# Patient Record
Sex: FEMALE | Race: WHITE | Employment: STUDENT | URBAN - METROPOLITAN AREA
[De-identification: names, ages, dates, MRNs, and addresses within clinical notes are randomized per-mention and may not be internally consistent; named-entity substitution may affect disease eponyms.]

---
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
## 2024-03-26 ENCOUNTER — OFFICE VISIT (OUTPATIENT)
Dept: PRIMARY CARE CLINIC | Age: 19
End: 2024-03-26
Payer: COMMERCIAL

## 2024-03-26 VITALS
OXYGEN SATURATION: 98 % | WEIGHT: 134.2 LBS | HEART RATE: 82 BPM | DIASTOLIC BLOOD PRESSURE: 62 MMHG | TEMPERATURE: 98.8 F | BODY MASS INDEX: 23.78 KG/M2 | HEIGHT: 63 IN | SYSTOLIC BLOOD PRESSURE: 118 MMHG

## 2024-03-26 DIAGNOSIS — M54.42 ACUTE MIDLINE LOW BACK PAIN WITH BILATERAL SCIATICA: Primary | ICD-10-CM

## 2024-03-26 DIAGNOSIS — M54.41 ACUTE MIDLINE LOW BACK PAIN WITH BILATERAL SCIATICA: Primary | ICD-10-CM

## 2024-03-26 PROCEDURE — 99213 OFFICE O/P EST LOW 20 MIN: CPT

## 2024-03-26 PROCEDURE — G8484 FLU IMMUNIZE NO ADMIN: HCPCS

## 2024-03-26 PROCEDURE — G8427 DOCREV CUR MEDS BY ELIG CLIN: HCPCS

## 2024-03-26 PROCEDURE — 1036F TOBACCO NON-USER: CPT

## 2024-03-26 PROCEDURE — G8420 CALC BMI NORM PARAMETERS: HCPCS

## 2024-03-26 RX ORDER — METHOCARBAMOL 750 MG/1
750 TABLET, FILM COATED ORAL
Qty: 28 TABLET | Refills: 0 | Status: SHIPPED | OUTPATIENT
Start: 2024-03-26

## 2024-03-26 RX ORDER — METHYLPREDNISOLONE 4 MG/1
TABLET ORAL
Qty: 1 KIT | Refills: 0 | Status: SHIPPED | OUTPATIENT
Start: 2024-03-26 | End: 2024-04-01

## 2024-03-26 ASSESSMENT — ENCOUNTER SYMPTOMS
GASTROINTESTINAL NEGATIVE: 1
BACK PAIN: 1
RESPIRATORY NEGATIVE: 1
BOWEL INCONTINENCE: 0
ALLERGIC/IMMUNOLOGIC NEGATIVE: 1

## 2024-03-26 ASSESSMENT — ANXIETY QUESTIONNAIRES
3. WORRYING TOO MUCH ABOUT DIFFERENT THINGS: NOT AT ALL
1. FEELING NERVOUS, ANXIOUS, OR ON EDGE: NEARLY EVERY DAY
6. BECOMING EASILY ANNOYED OR IRRITABLE: MORE THAN HALF THE DAYS
2. NOT BEING ABLE TO STOP OR CONTROL WORRYING: SEVERAL DAYS
5. BEING SO RESTLESS THAT IT IS HARD TO SIT STILL: SEVERAL DAYS
4. TROUBLE RELAXING: SEVERAL DAYS
IF YOU CHECKED OFF ANY PROBLEMS ON THIS QUESTIONNAIRE, HOW DIFFICULT HAVE THESE PROBLEMS MADE IT FOR YOU TO DO YOUR WORK, TAKE CARE OF THINGS AT HOME, OR GET ALONG WITH OTHER PEOPLE: SOMEWHAT DIFFICULT
GAD7 TOTAL SCORE: 10
7. FEELING AFRAID AS IF SOMETHING AWFUL MIGHT HAPPEN: MORE THAN HALF THE DAYS

## 2024-03-26 ASSESSMENT — PATIENT HEALTH QUESTIONNAIRE - PHQ9
1. LITTLE INTEREST OR PLEASURE IN DOING THINGS: SEVERAL DAYS
SUM OF ALL RESPONSES TO PHQ QUESTIONS 1-9: 2
SUM OF ALL RESPONSES TO PHQ9 QUESTIONS 1 & 2: 2
2. FEELING DOWN, DEPRESSED OR HOPELESS: SEVERAL DAYS
SUM OF ALL RESPONSES TO PHQ QUESTIONS 1-9: 2

## 2024-03-26 NOTE — PROGRESS NOTES
motion.      Lumbar back: Tenderness present. Decreased range of motion.   Skin:     General: Skin is warm.      Capillary Refill: Capillary refill takes less than 2 seconds.   Neurological:      General: No focal deficit present.      Mental Status: She is alert.   Psychiatric:         Mood and Affect: Mood normal.        An electronic signature was used to authenticate this note.    --Sahra Cruz, APRN - CNP

## 2024-04-18 ENCOUNTER — OFFICE VISIT (OUTPATIENT)
Dept: OBGYN CLINIC | Age: 19
End: 2024-04-18
Payer: COMMERCIAL

## 2024-04-18 VITALS
OXYGEN SATURATION: 99 % | BODY MASS INDEX: 23.39 KG/M2 | WEIGHT: 132 LBS | SYSTOLIC BLOOD PRESSURE: 110 MMHG | DIASTOLIC BLOOD PRESSURE: 72 MMHG | HEIGHT: 63 IN | HEART RATE: 78 BPM | TEMPERATURE: 98.7 F

## 2024-04-18 DIAGNOSIS — Z97.5 BREAKTHROUGH BLEEDING ON NEXPLANON: Primary | ICD-10-CM

## 2024-04-18 DIAGNOSIS — N92.1 BREAKTHROUGH BLEEDING ON NEXPLANON: Primary | ICD-10-CM

## 2024-04-18 PROCEDURE — G8428 CUR MEDS NOT DOCUMENT: HCPCS | Performed by: OBSTETRICS & GYNECOLOGY

## 2024-04-18 PROCEDURE — 1036F TOBACCO NON-USER: CPT | Performed by: OBSTETRICS & GYNECOLOGY

## 2024-04-18 PROCEDURE — G8420 CALC BMI NORM PARAMETERS: HCPCS | Performed by: OBSTETRICS & GYNECOLOGY

## 2024-04-18 PROCEDURE — 99213 OFFICE O/P EST LOW 20 MIN: CPT | Performed by: OBSTETRICS & GYNECOLOGY

## 2024-04-18 RX ORDER — LEVONORGESTREL AND ETHINYL ESTRADIOL 0.1-0.02MG
1 KIT ORAL DAILY
Qty: 1 PACKET | Refills: 3 | Status: SHIPPED | OUTPATIENT
Start: 2024-04-18

## 2024-04-18 SDOH — ECONOMIC STABILITY: FOOD INSECURITY: WITHIN THE PAST 12 MONTHS, THE FOOD YOU BOUGHT JUST DIDN'T LAST AND YOU DIDN'T HAVE MONEY TO GET MORE.: NEVER TRUE

## 2024-04-18 SDOH — ECONOMIC STABILITY: INCOME INSECURITY: HOW HARD IS IT FOR YOU TO PAY FOR THE VERY BASICS LIKE FOOD, HOUSING, MEDICAL CARE, AND HEATING?: NOT HARD AT ALL

## 2024-04-18 SDOH — ECONOMIC STABILITY: TRANSPORTATION INSECURITY
IN THE PAST 12 MONTHS, HAS LACK OF TRANSPORTATION KEPT YOU FROM MEETINGS, WORK, OR FROM GETTING THINGS NEEDED FOR DAILY LIVING?: PATIENT DECLINED

## 2024-04-18 SDOH — ECONOMIC STABILITY: FOOD INSECURITY: WITHIN THE PAST 12 MONTHS, YOU WORRIED THAT YOUR FOOD WOULD RUN OUT BEFORE YOU GOT MONEY TO BUY MORE.: NEVER TRUE

## 2024-04-18 SDOH — ECONOMIC STABILITY: HOUSING INSECURITY
IN THE LAST 12 MONTHS, WAS THERE A TIME WHEN YOU DID NOT HAVE A STEADY PLACE TO SLEEP OR SLEPT IN A SHELTER (INCLUDING NOW)?: PATIENT DECLINED

## 2024-04-18 NOTE — PROGRESS NOTES
Return Gyn Office Visit    CC:   Chief Complaint   Patient presents with    Vaginal Bleeding     Pt has nexplanon and is having abnormal bleeding since 2024       HPI:  18 y.o.  who presents to office for AUB with nexplanon. Had it placed 3/2023, was having irregular bleeding so did a short trial of OCP which helped. Stopped those and now has started to bleed irregularly again. Having daily bleeding, very light and using a pad. Has a period once a month as well that is normal.     Objective:  /72   Pulse 78   Temp 98.7 °F (37.1 °C) (Temporal)   Ht 1.6 m (5' 3\")   Wt 59.9 kg (132 lb)   LMP 2024   SpO2 99%   BMI 23.38 kg/m²   Physical Exam  HENT:      Head: Normocephalic.   Cardiovascular:      Rate and Rhythm: Normal rate.   Pulmonary:      Effort: Pulmonary effort is normal.   Abdominal:      Palpations: Abdomen is soft.      Tenderness: There is no abdominal tenderness.   Neurological:      General: No focal deficit present.      Mental Status: She is alert.   Psychiatric:         Mood and Affect: Mood normal.         Assessment/Plan  1. Breakthrough bleeding on Nexplanon  Reviewed options for management, another short course of OCP, removal, etc. Patient desires to keep nexplanon now, will send rx for OCP x2-3 months. Rx provided, return precautions discussed.    Dispo: PRN or for annual exam  Cyndi Owens MD

## 2024-05-16 RX ORDER — LEVONORGESTREL AND ETHINYL ESTRADIOL 0.1-0.02MG
1 KIT ORAL DAILY
Qty: 28 TABLET | Refills: 11 | Status: SHIPPED | OUTPATIENT
Start: 2024-05-16

## 2024-05-16 NOTE — TELEPHONE ENCOUNTER
Voicemail if full, unable to leave message. Will send mychart to see if patient is needing refill.

## 2024-06-17 ENCOUNTER — HOSPITAL ENCOUNTER (EMERGENCY)
Age: 19
Discharge: HOME | End: 2024-06-18
Payer: COMMERCIAL

## 2024-06-17 VITALS
OXYGEN SATURATION: 98 % | HEART RATE: 78 BPM | RESPIRATION RATE: 18 BRPM | SYSTOLIC BLOOD PRESSURE: 119 MMHG | DIASTOLIC BLOOD PRESSURE: 78 MMHG | TEMPERATURE: 98.7 F
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
## 2024-06-17 PROCEDURE — 99284 EMERGENCY DEPT VISIT MOD MDM: CPT

## 2024-06-17 RX ORDER — AMOXICILLIN AND CLAVULANATE POTASSIUM 875; 125 MG/1; MG/1
1 TABLET, FILM COATED ORAL 2 TIMES DAILY
Qty: 20 TABLET | Refills: 0 | Status: SHIPPED | OUTPATIENT
Start: 2024-06-17 | End: 2024-06-27

## 2024-06-17 RX ORDER — AMOXICILLIN AND CLAVULANATE POTASSIUM 875; 125 MG/1; MG/1
1 TABLET, FILM COATED ORAL ONCE
Status: COMPLETED | OUTPATIENT
Start: 2024-06-18 | End: 2024-06-18

## 2024-06-18 PROCEDURE — 6370000000 HC RX 637 (ALT 250 FOR IP): Performed by: PHYSICIAN ASSISTANT

## 2024-06-18 PROCEDURE — 6360000002 HC RX W HCPCS: Performed by: PHYSICIAN ASSISTANT

## 2024-06-18 PROCEDURE — 90471 IMMUNIZATION ADMIN: CPT | Performed by: PHYSICIAN ASSISTANT

## 2024-06-18 PROCEDURE — 90715 TDAP VACCINE 7 YRS/> IM: CPT | Performed by: PHYSICIAN ASSISTANT

## 2024-06-18 RX ADMIN — AMOXICILLIN AND CLAVULANATE POTASSIUM 1 TABLET: 875; 125 TABLET, FILM COATED ORAL at 00:03

## 2024-06-18 RX ADMIN — TETANUS TOXOID, REDUCED DIPHTHERIA TOXOID AND ACELLULAR PERTUSSIS VACCINE, ADSORBED 0.5 ML: 5; 2.5; 8; 8; 2.5 SUSPENSION INTRAMUSCULAR at 00:03

## 2024-06-18 NOTE — DISCHARGE INSTRUCTIONS
Take all antibiotics until complete  Follow up with PCP in 2-3 days  Keep wound clean and moist (vasoline/aquafore as we discussed)

## 2024-06-19 ASSESSMENT — ENCOUNTER SYMPTOMS
EYE REDNESS: 0
ABDOMINAL PAIN: 0
SINUS PAIN: 0
CONSTIPATION: 0
NAUSEA: 0
CHEST TIGHTNESS: 0
EYE DISCHARGE: 0
RHINORRHEA: 0
SINUS PRESSURE: 0
VOMITING: 0
DIARRHEA: 0
SORE THROAT: 0
SHORTNESS OF BREATH: 0
COUGH: 0

## 2024-06-19 NOTE — ED PROVIDER NOTES
to person, place, and time.   Psychiatric:         Mood and Affect: Mood normal.         Behavior: Behavior normal.         DIAGNOSTIC RESULTS       PAST MEDICAL HISTORY      has a past medical history of Chronic back pain (March 2019), Diarrhea, and Headache (Randomly).     Chronic Conditions affecting Care: above    EMERGENCY DEPARTMENT COURSE and DIFFERENTIAL DIAGNOSIS/MDM:   Vitals:    Vitals:    06/17/24 2317   BP: 119/78   Pulse: 78   Resp: 18   Temp: 98.7 °F (37.1 °C)   TempSrc: Oral   SpO2: 98%       Patient was given the following medications:  Medications   amoxicillin-clavulanate (AUGMENTIN) 875-125 MG per tablet 1 tablet (1 tablet Oral Given 6/18/24 0003)   tetanus-diphth-acell pertussis (BOOSTRIX) injection 0.5 mL (0.5 mLs IntraMUSCular Given 6/18/24 0003)             Is this patient to be included in the SEP-1 Core Measure due to severe sepsis or septic shock?   No   Exclusion criteria - the patient is NOT to be included for SEP-1 Core Measure due to:  2+ SIRS criteria are not met      CC/HPI Summary, DDx, ED Course, and Reassessment: Afebrile nontoxic patient in no acute distress.  Patient with superficial puncture wounds and abrasions to her face secondary to being by a dog she knows that shots are up-to-date.  The wounds are cleaned and copiously irrigated her tetanus is updated.  She started on antibiotics.  We discussed that we will not close the wound secondary to our concern for possible development of infection in patient does agree.  Shared decision making.  She will be sent home with antibiotics and the close follow-up we did discuss that she may need plastic surgery follow-up in 6 months if she is unhappy with the cosmetic results of the healing.  At this time no indication for inpatient treatment she is discharged in stable condition.      I estimate there is LOW risk for CELLULITIS, COMPARTMENT SYNDROME, NECROTIZING FASCIITIS, TENDON OR NEUROVASCULAR INJURY, or FOREIGN BODY, thus I

## 2025-01-13 ENCOUNTER — OFFICE VISIT (OUTPATIENT)
Dept: FAMILY MEDICINE CLINIC | Age: 20
End: 2025-01-13
Payer: COMMERCIAL

## 2025-01-13 VITALS
HEART RATE: 88 BPM | HEIGHT: 63 IN | DIASTOLIC BLOOD PRESSURE: 74 MMHG | BODY MASS INDEX: 24.45 KG/M2 | SYSTOLIC BLOOD PRESSURE: 116 MMHG | OXYGEN SATURATION: 99 % | WEIGHT: 138 LBS

## 2025-01-13 DIAGNOSIS — G47.00 INSOMNIA, UNSPECIFIED TYPE: ICD-10-CM

## 2025-01-13 DIAGNOSIS — R21 RASH: Primary | ICD-10-CM

## 2025-01-13 DIAGNOSIS — R53.83 OTHER FATIGUE: ICD-10-CM

## 2025-01-13 PROCEDURE — G8427 DOCREV CUR MEDS BY ELIG CLIN: HCPCS | Performed by: PHYSICIAN ASSISTANT

## 2025-01-13 PROCEDURE — 99213 OFFICE O/P EST LOW 20 MIN: CPT | Performed by: PHYSICIAN ASSISTANT

## 2025-01-13 PROCEDURE — G8420 CALC BMI NORM PARAMETERS: HCPCS | Performed by: PHYSICIAN ASSISTANT

## 2025-01-13 PROCEDURE — G2211 COMPLEX E/M VISIT ADD ON: HCPCS | Performed by: PHYSICIAN ASSISTANT

## 2025-01-13 PROCEDURE — 1036F TOBACCO NON-USER: CPT | Performed by: PHYSICIAN ASSISTANT

## 2025-01-13 RX ORDER — TRIAMCINOLONE ACETONIDE 0.25 MG/G
OINTMENT TOPICAL
Qty: 15 G | Refills: 0 | Status: SHIPPED | OUTPATIENT
Start: 2025-01-13 | End: 2025-01-20

## 2025-01-13 SDOH — ECONOMIC STABILITY: FOOD INSECURITY: WITHIN THE PAST 12 MONTHS, YOU WORRIED THAT YOUR FOOD WOULD RUN OUT BEFORE YOU GOT MONEY TO BUY MORE.: NEVER TRUE

## 2025-01-13 SDOH — ECONOMIC STABILITY: FOOD INSECURITY: WITHIN THE PAST 12 MONTHS, THE FOOD YOU BOUGHT JUST DIDN'T LAST AND YOU DIDN'T HAVE MONEY TO GET MORE.: NEVER TRUE

## 2025-01-13 ASSESSMENT — PATIENT HEALTH QUESTIONNAIRE - PHQ9
3. TROUBLE FALLING OR STAYING ASLEEP: NEARLY EVERY DAY
SUM OF ALL RESPONSES TO PHQ QUESTIONS 1-9: 6
6. FEELING BAD ABOUT YOURSELF - OR THAT YOU ARE A FAILURE OR HAVE LET YOURSELF OR YOUR FAMILY DOWN: NOT AT ALL
SUM OF ALL RESPONSES TO PHQ QUESTIONS 1-9: 6
1. LITTLE INTEREST OR PLEASURE IN DOING THINGS: NOT AT ALL
7. TROUBLE CONCENTRATING ON THINGS, SUCH AS READING THE NEWSPAPER OR WATCHING TELEVISION: NOT AT ALL
4. FEELING TIRED OR HAVING LITTLE ENERGY: NEARLY EVERY DAY
5. POOR APPETITE OR OVEREATING: NOT AT ALL
SUM OF ALL RESPONSES TO PHQ QUESTIONS 1-9: 6
10. IF YOU CHECKED OFF ANY PROBLEMS, HOW DIFFICULT HAVE THESE PROBLEMS MADE IT FOR YOU TO DO YOUR WORK, TAKE CARE OF THINGS AT HOME, OR GET ALONG WITH OTHER PEOPLE: NOT DIFFICULT AT ALL
8. MOVING OR SPEAKING SO SLOWLY THAT OTHER PEOPLE COULD HAVE NOTICED. OR THE OPPOSITE, BEING SO FIGETY OR RESTLESS THAT YOU HAVE BEEN MOVING AROUND A LOT MORE THAN USUAL: NOT AT ALL
2. FEELING DOWN, DEPRESSED OR HOPELESS: NOT AT ALL
9. THOUGHTS THAT YOU WOULD BE BETTER OFF DEAD, OR OF HURTING YOURSELF: NOT AT ALL
SUM OF ALL RESPONSES TO PHQ9 QUESTIONS 1 & 2: 0
SUM OF ALL RESPONSES TO PHQ QUESTIONS 1-9: 6

## 2025-01-13 ASSESSMENT — ENCOUNTER SYMPTOMS: RESPIRATORY NEGATIVE: 1

## 2025-01-13 NOTE — PROGRESS NOTES
Subjective   Patient ID: Alicia Yi is a 19 y.o. female.    Rash  Associated symptoms include fatigue.     Patient presents with a rash that started on 1/9. It started as one red raised spot on the right hand and has now spread to numerous spots on both hands/forearms. No changes in any products. No one else at home with rash. It is pruritic.     Her other concern is fatigue and insomnia. She can typically fall asleep but hard time I she wakes up getting back to sleep and no matter how long she does sleep always feels tired.    Review of Systems   Constitutional:  Positive for fatigue.   Respiratory: Negative.     Cardiovascular: Negative.    Skin:  Positive for rash.   Psychiatric/Behavioral:  Positive for sleep disturbance.           Objective   Physical Exam  Constitutional:       Appearance: Normal appearance.   Cardiovascular:      Rate and Rhythm: Normal rate and regular rhythm.      Heart sounds: Normal heart sounds.   Pulmonary:      Effort: Pulmonary effort is normal.      Breath sounds: Normal breath sounds.   Skin:     Comments: Scattered erythematous slightly raised pinpoint lesion on forearms/hands  Approximately 10    Neurological:      Mental Status: She is alert.            Assessment /Plan:     Diagnosis Orders   1. Rash  Triamcinolone ointment bid.       2. Insomnia, unspecified type        3. Other fatigue  CBC    TSH reflex to FT4              MARILEE Renteria

## 2025-01-14 LAB
DEPRECATED RDW RBC AUTO: 13.2 % (ref 12.4–15.4)
HCT VFR BLD AUTO: 43.1 % (ref 36–48)
HGB BLD-MCNC: 14.2 G/DL (ref 12–16)
MCH RBC QN AUTO: 27.7 PG (ref 26–34)
MCHC RBC AUTO-ENTMCNC: 33 G/DL (ref 31–36)
MCV RBC AUTO: 84.1 FL (ref 80–100)
PLATELET # BLD AUTO: 244 K/UL (ref 135–450)
PMV BLD AUTO: 9 FL (ref 5–10.5)
RBC # BLD AUTO: 5.12 M/UL (ref 4–5.2)
TSH SERPL DL<=0.005 MIU/L-ACNC: 1.73 UIU/ML (ref 0.27–4.2)
WBC # BLD AUTO: 5.4 K/UL (ref 4–11)

## 2025-01-16 ENCOUNTER — TELEPHONE (OUTPATIENT)
Dept: OBGYN CLINIC | Age: 20
End: 2025-01-16

## 2025-01-16 NOTE — TELEPHONE ENCOUNTER
Pt called in requested letter stating her BC was necessary for her to take since [place] is [place]. Letter created and scanned into .     [employer] message sent with letter.    Called pt and made her aware. Done.

## 2025-03-03 NOTE — TELEPHONE ENCOUNTER
I see from Dr Calvo last night this was short term Rx for irregular bleeding with Nexplanon   Is she still taking medication and if so does she need a FU with her main GYN?    ALH

## 2025-03-03 NOTE — TELEPHONE ENCOUNTER
Pt last seen 04/2024 - due for annual     Med last filled 05/2024    Pt has nexplanon but was on OCP for breakthrough  bleeding    LMTCO

## 2025-03-06 RX ORDER — LEVONORGESTREL AND ETHINYL ESTRADIOL 0.1-0.02MG
1 KIT ORAL DAILY
Qty: 84 TABLET | Refills: 3 | Status: SHIPPED | OUTPATIENT
Start: 2025-03-06

## 2025-03-06 NOTE — TELEPHONE ENCOUNTER
Pt had sent another request for this as well. Didn't realize this was a [facility] pt. Did route other encounter to you. Pt has been contacted three times and [employer] message sent. Please advise.

## 2025-03-06 NOTE — TELEPHONE ENCOUNTER
I will sign short term refill until she is back in town to address with patient   Thanks     NICOL

## 2025-03-17 RX ORDER — LEVONORGESTREL/ETHIN.ESTRADIOL 0.1-0.02MG
1 TABLET ORAL DAILY
Qty: 28 TABLET | Refills: 2 | Status: SHIPPED | OUTPATIENT
Start: 2025-03-17

## 2025-07-23 ENCOUNTER — OFFICE VISIT (OUTPATIENT)
Dept: FAMILY MEDICINE CLINIC | Age: 20
End: 2025-07-23
Payer: COMMERCIAL

## 2025-07-23 VITALS
OXYGEN SATURATION: 98 % | BODY MASS INDEX: 28.6 KG/M2 | TEMPERATURE: 97.9 F | HEART RATE: 93 BPM | WEIGHT: 161.4 LBS | RESPIRATION RATE: 16 BRPM | DIASTOLIC BLOOD PRESSURE: 60 MMHG | HEIGHT: 63 IN | SYSTOLIC BLOOD PRESSURE: 118 MMHG

## 2025-07-23 DIAGNOSIS — R30.0 DYSURIA: Primary | ICD-10-CM

## 2025-07-23 DIAGNOSIS — R35.0 URINARY FREQUENCY: ICD-10-CM

## 2025-07-23 LAB
BILIRUBIN, POC: NEGATIVE
BLOOD URINE, POC: NEGATIVE
CLARITY, POC: NORMAL
COLOR, POC: NORMAL
GLUCOSE URINE, POC: NEGATIVE MG/DL
KETONES, POC: NEGATIVE MG/DL
LEUKOCYTE EST, POC: NORMAL
NITRITE, POC: NEGATIVE
PH, POC: 5.5
PROTEIN, POC: NEGATIVE MG/DL
SPECIFIC GRAVITY, POC: 1.01
UROBILINOGEN, POC: 0.2 MG/DL

## 2025-07-23 PROCEDURE — 99213 OFFICE O/P EST LOW 20 MIN: CPT | Performed by: PHYSICIAN ASSISTANT

## 2025-07-23 PROCEDURE — 1036F TOBACCO NON-USER: CPT | Performed by: PHYSICIAN ASSISTANT

## 2025-07-23 PROCEDURE — G8419 CALC BMI OUT NRM PARAM NOF/U: HCPCS | Performed by: PHYSICIAN ASSISTANT

## 2025-07-23 PROCEDURE — 81002 URINALYSIS NONAUTO W/O SCOPE: CPT | Performed by: PHYSICIAN ASSISTANT

## 2025-07-23 PROCEDURE — G8427 DOCREV CUR MEDS BY ELIG CLIN: HCPCS | Performed by: PHYSICIAN ASSISTANT

## 2025-07-23 NOTE — PROGRESS NOTES
[facility]    CC:   Chief Complaint   Patient presents with    Urinary Frequency     Patient is here with complaints of urinary frequency and frequent urge to go       History of Present Illness:    Alicia Yi is a 20 y.o. female who complains of today upon waking with   urgency, incomplete bladder emptying.    does not have a history of recurrent UTI.       Denies:  Fever, chills, flank or suprapubic pains. No nausea. Appetite unchanged.  Denies GYN changes. Denies bowel habit changes, melena, hematochezia.      Physical exam:      Vitals:    07/23/25 1454   BP: 118/60   Pulse: 93   Resp: 16   Temp: 97.9 °F (36.6 °C)   TempSrc: Temporal   SpO2: 98%   Weight: 73.2 kg (161 lb 6.4 oz)   Height: 1.6 m (5' 3\")     APPEARANCE:    No acute distress.  HEART: Reg rate and rhythm. No murmurs, rubs, or gallops.   LUNGS: Clear to auscultation. No wheezes, rales, or rhonchi.  ABDOMEN:  Soft, bowel sounds present,  non-tender, no masses or HSM. No CVAT.  NEUROLOGIC: grossly non focal  SKIN: Warm, dry, normal turgor. Cap refill <3secs.  No rashes, petechiae, purpura.     Laboratory:   Results for orders placed or performed in visit on 07/23/25   POCT Urinalysis no Micro   Result Value Ref Range    Color, UA      Clarity, UA      Glucose, UA POC negative mg/dL    Bilirubin, UA negative     Ketones, UA negative mg/dL    Spec Grav, UA 1.010     Blood, UA POC negative     pH, UA 5.5     Protein, UA POC negative mg/dL    Urobilinogen, UA 0.2 mg/dL    Leukocytes, UA small     Nitrite, UA negative       Urine culture Indicated/ordered     Assessment/ Plan      1. Dysuria    2. Urinary frequency     -Discontinue the sodas and excess juices and drink more H2o  -Treat pending culture results.         Electronically signed by MARILEE Tian on 7/23/2025 at 3:12 PM

## 2025-07-24 ENCOUNTER — TELEPHONE (OUTPATIENT)
Dept: FAMILY MEDICINE CLINIC | Age: 20
End: 2025-07-24

## 2025-07-24 RX ORDER — SULFAMETHOXAZOLE AND TRIMETHOPRIM 800; 160 MG/1; MG/1
1 TABLET ORAL 2 TIMES DAILY
Qty: 6 TABLET | Refills: 0 | Status: SHIPPED | OUTPATIENT
Start: 2025-07-24 | End: 2025-07-27

## 2025-07-24 NOTE — TELEPHONE ENCOUNTER
Pt states that she was seen yesterday and did a UA and it was sent out for a culture. She states that she is now wetting herself from not being able to get to the bathroom fast enough. She wants to know if an antibiotic can be called in while we wait for the culture results.

## 2025-07-25 LAB
BACTERIA UR CULT: ABNORMAL
ORGANISM: ABNORMAL

## 2025-08-14 ENCOUNTER — OFFICE VISIT (OUTPATIENT)
Dept: FAMILY MEDICINE CLINIC | Age: 20
End: 2025-08-14
Payer: COMMERCIAL

## 2025-08-14 VITALS
HEART RATE: 91 BPM | BODY MASS INDEX: 28.49 KG/M2 | HEIGHT: 63 IN | SYSTOLIC BLOOD PRESSURE: 118 MMHG | WEIGHT: 160.8 LBS | RESPIRATION RATE: 16 BRPM | OXYGEN SATURATION: 97 % | DIASTOLIC BLOOD PRESSURE: 74 MMHG

## 2025-08-14 DIAGNOSIS — S61.101A AVULSION OF NAIL OF RIGHT THUMB: Primary | ICD-10-CM

## 2025-08-14 PROCEDURE — 99213 OFFICE O/P EST LOW 20 MIN: CPT | Performed by: PHYSICIAN ASSISTANT

## 2025-08-14 PROCEDURE — G8419 CALC BMI OUT NRM PARAM NOF/U: HCPCS | Performed by: PHYSICIAN ASSISTANT

## 2025-08-14 PROCEDURE — 1036F TOBACCO NON-USER: CPT | Performed by: PHYSICIAN ASSISTANT

## 2025-08-14 PROCEDURE — G8427 DOCREV CUR MEDS BY ELIG CLIN: HCPCS | Performed by: PHYSICIAN ASSISTANT

## 2025-08-14 RX ORDER — CEPHALEXIN 500 MG/1
500 CAPSULE ORAL 3 TIMES DAILY
Qty: 21 CAPSULE | Refills: 0 | Status: SHIPPED | OUTPATIENT
Start: 2025-08-14 | End: 2025-08-21

## 2025-08-14 ASSESSMENT — PATIENT HEALTH QUESTIONNAIRE - PHQ9
SUM OF ALL RESPONSES TO PHQ QUESTIONS 1-9: 0
SUM OF ALL RESPONSES TO PHQ QUESTIONS 1-9: 0
2. FEELING DOWN, DEPRESSED OR HOPELESS: NOT AT ALL
SUM OF ALL RESPONSES TO PHQ QUESTIONS 1-9: 0
1. LITTLE INTEREST OR PLEASURE IN DOING THINGS: NOT AT ALL
SUM OF ALL RESPONSES TO PHQ QUESTIONS 1-9: 0

## 2025-08-14 ASSESSMENT — ENCOUNTER SYMPTOMS: RESPIRATORY NEGATIVE: 1
